# Patient Record
Sex: FEMALE | Race: WHITE | NOT HISPANIC OR LATINO | Employment: FULL TIME | URBAN - METROPOLITAN AREA
[De-identification: names, ages, dates, MRNs, and addresses within clinical notes are randomized per-mention and may not be internally consistent; named-entity substitution may affect disease eponyms.]

---

## 2023-03-07 ENCOUNTER — TELEPHONE (OUTPATIENT)
Dept: PRIMARY CARE | Facility: CLINIC | Age: 53
End: 2023-03-07
Payer: MEDICARE

## 2023-03-07 DIAGNOSIS — M54.12 CERVICAL RADICULOPATHY: Primary | ICD-10-CM

## 2023-03-07 NOTE — TELEPHONE ENCOUNTER
Pt called asking for a renew prescription for [employer] massage.  Mail it to pt and fax it to [facility] at  Fax [phone]    Please call pt back at [phone]

## 2023-03-21 DIAGNOSIS — H04.123 DRY EYE SYNDROME OF BOTH EYES: ICD-10-CM

## 2023-03-21 PROBLEM — H04.129 DRY EYE SYNDROME: Status: ACTIVE | Noted: 2023-03-21

## 2023-03-21 RX ORDER — LOTEPREDNOL ETABONATE AND TOBRAMYCIN 5; 3 MG/ML; MG/ML
1 SUSPENSION/ DROPS OPHTHALMIC 4 TIMES DAILY
COMMUNITY
Start: 2018-05-15 | End: 2023-03-21 | Stop reason: SDUPTHER

## 2023-03-21 RX ORDER — LOTEPREDNOL ETABONATE AND TOBRAMYCIN 5; 3 MG/ML; MG/ML
SUSPENSION/ DROPS OPHTHALMIC
Qty: 5 ML | Refills: 1 | Status: SHIPPED | OUTPATIENT
Start: 2023-03-21

## 2023-08-03 ENCOUNTER — TELEPHONE (OUTPATIENT)
Dept: PRIMARY CARE | Facility: CLINIC | Age: 53
End: 2023-08-03
Payer: MEDICARE

## 2023-08-03 DIAGNOSIS — J20.9 ACUTE BRONCHITIS, UNSPECIFIED ORGANISM: Primary | ICD-10-CM

## 2023-08-03 RX ORDER — DOXYCYCLINE 100 MG/1
100 CAPSULE ORAL 2 TIMES DAILY
Qty: 28 CAPSULE | Refills: 0 | Status: SHIPPED | OUTPATIENT
Start: 2023-08-03 | End: 2023-08-17

## 2023-08-03 NOTE — TELEPHONE ENCOUNTER
Patient calling / states she has had a sinus infection x 2 weeks. Nasal congestion / sinus headaches/pain. States she took a Covid test which was negative.     States dr has prescribed Doxycycline in the past for her sinus infections.   Would like to know if you be willing to prescribe medication without being seen   Uses [facility] on [address]     Last seen in Sept 2022     Please advise

## 2023-10-08 PROBLEM — L23.5 ALLERGIC CONTACT DERMATITIS DUE TO OTHER CHEMICAL PRODUCTS: Status: ACTIVE | Noted: 2018-09-11

## 2023-10-08 PROBLEM — M25.552 HIP PAIN, LEFT: Status: ACTIVE | Noted: 2023-10-08

## 2023-10-08 PROBLEM — H52.4 HYPEROPIA WITH PRESBYOPIA OF RIGHT EYE: Status: ACTIVE | Noted: 2023-10-08

## 2023-10-08 PROBLEM — H52.209 ASTIGMATISM: Status: ACTIVE | Noted: 2023-10-08

## 2023-10-08 PROBLEM — T14.8XXA HEMATOMA: Status: ACTIVE | Noted: 2023-10-08

## 2023-10-08 PROBLEM — J47.9 BRONCHIECTASIS (MULTI): Status: ACTIVE | Noted: 2023-10-08

## 2023-10-08 PROBLEM — Z12.11 COLON CANCER SCREENING: Status: ACTIVE | Noted: 2023-10-08

## 2023-10-08 PROBLEM — L01.00 IMPETIGO: Status: ACTIVE | Noted: 2018-09-11

## 2023-10-08 PROBLEM — H02.889 MEIBOMIAN GLAND DYSFUNCTION: Status: ACTIVE | Noted: 2023-10-08

## 2023-10-08 PROBLEM — R52 PAIN, GENERALIZED: Status: ACTIVE | Noted: 2023-10-08

## 2023-10-08 PROBLEM — C92.11: Status: ACTIVE | Noted: 2023-10-08

## 2023-10-08 PROBLEM — F41.1 ANXIETY IN ACUTE STRESS REACTION: Status: ACTIVE | Noted: 2023-10-08

## 2023-10-08 PROBLEM — H01.003 BLEPHARITIS OF BOTH EYES: Status: ACTIVE | Noted: 2023-10-08

## 2023-10-08 PROBLEM — D83.9 CVID (COMMON VARIABLE IMMUNODEFICIENCY) (MULTI): Status: ACTIVE | Noted: 2023-10-08

## 2023-10-08 PROBLEM — I49.8: Status: ACTIVE | Noted: 2023-10-08

## 2023-10-08 PROBLEM — S92.302A FRACTURE OF METATARSAL BONE OF LEFT FOOT: Status: ACTIVE | Noted: 2023-10-08

## 2023-10-08 PROBLEM — G89.29 CHRONIC PAIN OF LEFT KNEE: Status: ACTIVE | Noted: 2023-10-08

## 2023-10-08 PROBLEM — M81.0 OSTEOPOROSIS: Status: ACTIVE | Noted: 2023-10-08

## 2023-10-08 PROBLEM — F43.21 SITUATIONAL DEPRESSION: Status: ACTIVE | Noted: 2023-10-08

## 2023-10-08 PROBLEM — L30.9 ECZEMA: Status: ACTIVE | Noted: 2018-09-11

## 2023-10-08 PROBLEM — H52.00 HYPEROPIA: Status: ACTIVE | Noted: 2023-10-08

## 2023-10-08 PROBLEM — A60.00 HERPES GENITALIA: Status: ACTIVE | Noted: 2023-10-08

## 2023-10-08 PROBLEM — K13.0 ANGULAR CHEILITIS: Status: ACTIVE | Noted: 2023-10-08

## 2023-10-08 PROBLEM — I47.10 SUPRAVENTRICULAR TACHYCARDIA (CMS-HCC): Status: ACTIVE | Noted: 2023-10-08

## 2023-10-08 PROBLEM — H69.90 EUSTACHIAN TUBE DYSFUNCTION: Status: ACTIVE | Noted: 2023-10-08

## 2023-10-08 PROBLEM — S82.63XA CLOSED FRACTURE OF LATERAL MALLEOLUS: Status: ACTIVE | Noted: 2023-10-08

## 2023-10-08 PROBLEM — F40.243 FEAR OF FLYING: Status: ACTIVE | Noted: 2023-10-08

## 2023-10-08 PROBLEM — E03.9 HYPOTHYROIDISM: Status: ACTIVE | Noted: 2023-10-08

## 2023-10-08 PROBLEM — R10.32 LEFT LOWER QUADRANT PAIN: Status: ACTIVE | Noted: 2023-10-08

## 2023-10-08 PROBLEM — L20.9 ATOPIC DERMATITIS: Status: ACTIVE | Noted: 2023-10-08

## 2023-10-08 PROBLEM — M25.562 CHRONIC PAIN OF LEFT KNEE: Status: ACTIVE | Noted: 2023-10-08

## 2023-10-08 PROBLEM — M70.62 TROCHANTERIC BURSITIS OF LEFT HIP: Status: ACTIVE | Noted: 2023-10-08

## 2023-10-08 PROBLEM — H01.006 BLEPHARITIS OF BOTH EYES: Status: ACTIVE | Noted: 2023-10-08

## 2023-10-08 PROBLEM — M20.11 HALLUX VALGUS, RIGHT: Status: ACTIVE | Noted: 2023-10-08

## 2023-10-08 PROBLEM — M25.551 BILATERAL HIP PAIN: Status: ACTIVE | Noted: 2023-10-08

## 2023-10-08 PROBLEM — R10.2 FEMALE PELVIC PAIN: Status: ACTIVE | Noted: 2023-10-08

## 2023-10-08 PROBLEM — R21 RASH AND OTHER NONSPECIFIC SKIN ERUPTION: Status: ACTIVE | Noted: 2018-09-11

## 2023-10-08 PROBLEM — B37.0 CANDIDIASIS OF MOUTH: Status: ACTIVE | Noted: 2023-10-08

## 2023-10-08 PROBLEM — F43.0 ANXIETY IN ACUTE STRESS REACTION: Status: ACTIVE | Noted: 2023-10-08

## 2023-10-08 PROBLEM — H52.01 HYPEROPIA WITH PRESBYOPIA OF RIGHT EYE: Status: ACTIVE | Noted: 2023-10-08

## 2023-10-08 PROBLEM — L70.9 ACNE: Status: ACTIVE | Noted: 2018-09-11

## 2023-10-08 PROBLEM — H52.03 HYPEROPIA OF BOTH EYES: Status: ACTIVE | Noted: 2023-10-08

## 2023-10-08 PROBLEM — I49.9 ARRHYTHMIA: Status: ACTIVE | Noted: 2023-10-08

## 2023-10-08 PROBLEM — N95.2 VAGINAL ATROPHY: Status: ACTIVE | Noted: 2023-10-08

## 2023-10-08 PROBLEM — M25.552 BILATERAL HIP PAIN: Status: ACTIVE | Noted: 2023-10-08

## 2023-10-08 PROBLEM — M54.12 CERVICAL RADICULOPATHY: Status: ACTIVE | Noted: 2023-10-08

## 2023-10-08 PROBLEM — S92.353A CLOSED FRACTURE OF FIFTH METATARSAL BONE: Status: ACTIVE | Noted: 2023-10-08

## 2023-10-08 PROBLEM — E89.40 OVARIAN FAILURE DUE TO CANCER THERAPY: Status: ACTIVE | Noted: 2023-10-08

## 2023-10-08 RX ORDER — FLUOCINONIDE 0.5 MG/G
1 CREAM TOPICAL 2 TIMES DAILY
COMMUNITY
Start: 2017-05-09 | End: 2023-10-12 | Stop reason: ALTCHOICE

## 2023-10-08 RX ORDER — MUPIROCIN 20 MG/G
1 OINTMENT TOPICAL
COMMUNITY
Start: 2017-10-31 | End: 2023-10-12 | Stop reason: ALTCHOICE

## 2023-10-08 RX ORDER — CARBOXYMETHYLCELLULOSE SODIUM 0.5 %
1 DROPPERETTE, SINGLE-USE DROP DISPENSER OPHTHALMIC (EYE) 2 TIMES DAILY PRN
COMMUNITY
Start: 2021-01-06

## 2023-10-08 RX ORDER — DESOXIMETASONE 0.5 MG/G
1 OINTMENT TOPICAL DAILY
COMMUNITY
Start: 2018-03-20 | End: 2023-10-12 | Stop reason: ALTCHOICE

## 2023-10-08 RX ORDER — CLOBETASOL PROPIONATE 0.5 MG/G
1 CREAM TOPICAL 2 TIMES DAILY
COMMUNITY
Start: 2014-10-29 | End: 2023-10-12 | Stop reason: ALTCHOICE

## 2023-10-08 RX ORDER — SPIRONOLACTONE 50 MG/1
50 TABLET, FILM COATED ORAL DAILY
COMMUNITY
Start: 2014-10-29 | End: 2023-10-12 | Stop reason: ALTCHOICE

## 2023-10-08 RX ORDER — METOPROLOL SUCCINATE 25 MG/1
25 TABLET, EXTENDED RELEASE ORAL DAILY
COMMUNITY
Start: 2013-01-10

## 2023-10-08 RX ORDER — KETOCONAZOLE 20 MG/G
1 CREAM TOPICAL 2 TIMES DAILY
COMMUNITY
Start: 2017-10-24 | End: 2023-10-12 | Stop reason: ALTCHOICE

## 2023-10-08 RX ORDER — CYCLOBENZAPRINE HCL 10 MG
10 TABLET ORAL 3 TIMES DAILY PRN
COMMUNITY
End: 2023-10-12 | Stop reason: ALTCHOICE

## 2023-10-08 RX ORDER — MULTIVITAMIN
1 TABLET ORAL DAILY
COMMUNITY

## 2023-10-08 RX ORDER — HYDROCORTISONE 25 MG/G
1 OINTMENT TOPICAL 2 TIMES DAILY
COMMUNITY
Start: 2017-10-24

## 2023-10-08 RX ORDER — FLUTICASONE PROPIONATE 50 MCG
1 SPRAY, SUSPENSION (ML) NASAL DAILY
COMMUNITY
Start: 2022-11-08 | End: 2023-10-12 | Stop reason: ALTCHOICE

## 2023-10-11 RX ORDER — SALINE NASAL SPRAY 1.5 OZ
SOLUTION NASAL
COMMUNITY
Start: 2022-11-08

## 2023-10-11 RX ORDER — VALACYCLOVIR HYDROCHLORIDE 500 MG/1
1 TABLET, FILM COATED ORAL DAILY
COMMUNITY
Start: 2018-04-23

## 2023-10-11 NOTE — PROGRESS NOTES
Subjective   Reason for Visit: Patsy Levine is an 52 y.o. female here for her Subsequent Medicare Assessment        She is not active due to the foot fracture.   She does not like that she is gaining weight   She is not doing as much biking as she used to do     She is in her home.   She did some of her own electrical work in the house and converted some of her appliances  She has a smaller plot of land and spent the summer working in the yard      She treated herself for a sinus infection but other than that she has been well     She has no issues with bowel movements    The palpitations have resolved     HEALTH  [facility] and 5/18 +ASCUS but HPV-, 5/19 negative, 2023- w/ Gyn  Mammo 1/13, 1/14, 11/2021 , 1/7/2022, 2023 and will do every other year now -[facility]  US of breasts 11/2021 normal   BD 1/13 T-2.7 and 1/15 T-2.5 hip, 2019, 8/20/20 , 9/20/2022 -2.8 hand no change, she does this at [facility]  Colon 11/11/2021 with [facility] Dr Lewis was normal   EKG  2014, 10/2021, 11/2021 8/2022   Urine 12/18  Hep C 8/16 -  Hep A 3/19  Flu declines   TDAP 2005 and recommend updating 2022  Typhoid 3/19  Pneumo 2010, 10/4/2022   Shingrix recommended and will check coverage   Pfizer CVD vaccine 3/22/2021 and 4/2/2021 booster 10/26/2021   Ophth She saw Dr Topete in 2021, no glaucoma or MD. She is using Zylet for BELLA OU.       Patient Care Team:  Veronique Cesar MD as PCP - General  Veronique Cesar MD as PCP - [facility] Attributed Provider     Review of Systems  All systems negative except those listed in the HPI      Past Medical, Surgical, and Family History reviewed and updated in chart.  Reviewed all medications by prescribing practitioner or clinical pharmacist   (such as prescriptions, OTCs, herbal therapies and supplements) and documented in the medical record      Objective   Vitals:  Visit Vitals  /62   Pulse 82   Temp 36.1 °C (97 °F)     Body mass index is 19.87 kg/m².     Physical Exam    Assessment/Plan   Problem List Items Addressed This Visit    None  Visit Diagnoses       CML (chronic myelocytic leukemia) (CMS/[facility])    -  Primary          Subsequent Medicare Assessment completed   Labs ordered     Medicare Wellness completed  -  Discussed healthy diet and regular exercise.    -  Physical exam overall unremarkable. Immunizations reviewed and updated accordingly. Healthy lifestyle choices discussed (tobacco avoidance, appropriate alcohol use, avoidance of illicit substances).   -  Patient is wearing seatbelt.   -  Screening lab work ordered as indicated.    -  Age appropriate screening tests reviewed with patient.        We spent 15 minutes discussing depression screen and there is nothing found that is of concern for underling depression. The PQH form was filled and the meds reviewed. She is seeing a psychiatrist     We spent 15 minutes discussing alcohol use and there are no concerns about overuse. The 15 min was spent in going over any issues of use of alcohol. Once a month     She has grab bars in the shower.  She has not fallen recently and no risk of falls in the house   She has good lighting around the house and functioning smoke detectors.     Seen in the ED on 8/8/2023 for left ankle pain  Nondisplaced left distal fibular fracture and fifth metatarsal fracture   She had left ankle pain since 8/8/2023 after falling off a rock running from bees   Xray showed fifth metatarsal fracture 8/2023  The patient received a splint and crutches.  The patient was discharged with instructions to follow-up with Ortho.   She saw Dr LoPresti in 8/2023 and was placed in a boot   She saw Dr LoPresti again in 8/2023 and healing well     She has no issues with her hearing to report     Her weight/ BMI is in low normal range in office. Recommend she maintain  Her weight is 125 with BMI at 19.87 IO 10/2023  Her increased stressors contributed to her weight  loss     Right sided chest pain that began acutely and woke her from sleep  Seen in the ED on 8/23/2022    EKG 8/2022 was sinus with occas PVC's and rate of 76  CT A/P 8/2022 normal   She saw cardiology at  10/3/2022  She had an ECHO and Holter done and was told imaging improved.   PVCs went from 20% to 3%     SVT and is tachy from the POTs:   Continue Toprol XL 25 mg daily   EKG 8/2022 was sinus with occas PVC's and rate of 76  [facility] and [facility] showed a few PVC's 11/2021 otherwise normal   She saw cardiology at  yesterday 10/3/2022  She had an ECHO and Holter done and was told imaging improved. PVCs went from 20% to 3%     I have spent 15 min face to face with this patient discussing their cardiac risk and behavioral therapies of nutrition choices and exercise. We are trying to eliminate habits that are contributing to their cardiac risk.  We agreed on a plan of how they can reduce their current CV risk   ACO score 3/6 IO 10/2023    Bronchiectasis:  Only affects her with humidity and exertion combined.  The inhalers give her infections so limited in treatment    Asthma:   Controlled. No atopy - no allergy detected.  The inhalers give her infections so limited in treatment    CVID -    Continue Hyqvia 15 grams Q month at home.   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 Continue replacement immunoglobulin.     LE edema:  Worsens in the heat and has Lasix for prn use     Situational depression:   She bought a house and has moved in now   [facility] and helping her since the divorce in 2014   [facility] therapist put her on Wellbutrin but she could not tolerate it so stopped   She will continue following with the [facility]     Saw Dr Choe in  12/1/16 for recurrent UTI :  She is much better with taking D mannose with cranberry daily     Colitis versus diverticulitis versus inflammatory bowel:   Her mother and sister have been diagnosed with diverticulitis   Colonoscopy 11/11/2021 with [facility] Dr Lewis was normal   CT abd 9/2021 was no evidence of colitis or diverticulitis, moderate stool   Recommend she begin a Probiotic daily while taking Abx   Recommend she avoid eating popcorn    Seen for possible SLE issues by Dr Martinez in 9/2013:  Nothing found and no arthralgias     Cervicalgia:    Continue Flexeril 10 mg prn   Recommend she continue PT exercises at home   Recommend she be mindful when moving and lifting.     She saw Dr Ernandez on 6/10/19 for right foot pain   Xray showed mild degenerative changes of the 1st MTP with hallux valgus deformity with HVA of 23*.   Recommended custom orthotics     Rashes on her legs: she starts developing rashes in winter  She sees Dr Horta and except for the Cobalt allergy nothing else found    Alopecia:  She saw derm 10/26/2021     Bacterial vaginosis issues and Dx with Herpes 2   Estrace cream only thing can use   Continue Valtrex prn     Pap [facility] in 20203 [facility]   [facility]amm[facility] in [facility]. She only has to do a mammogram every other year per [facility]  Breast exam normal 10/2023    [facility] and was T-2.8. She stopped Fosamax after 15 years.   Recommend taking TC Calcium 500 mg BID, OTC Vitamin D 2000 UT dailu and eat 2 servings of calcium enriched foods daily. Discussed weight bearing exercises. She remains busy and works out routinely   Colonoscopy normal 11/11/2021 with [facility] Dr Lewis     Ophth:  She saw Dr Topete in 2021 and no glaucoma or MD. She is using Zylet for BELLA OU.     I spent 15 min with the patient discussing their wishes for end of life choices.   We discussed the need for a Living Will and that wishes should be discussed with Family. The DNR status was reviewed, and we discussed  the options of this and, the DNR _CC options as well.   We also went over how important it was to have these choices written down and clear for any surviving family so that their wishes are followed   The patient and I came to to following agreement : She does not have a LW or MPOA. I provided her with the forms to complete and have notarized 10/2022. Recommend she bring a copy of her completed forms in office to have scanned in her chart 10/2023     Hep C 8/16 -  Hep A 3/19  Flu declines   TDAP 2005 and recommend updating 2022  Typhoid 3/19  Pneumo 2010, 10/4/2022   Shingrix recommended and will check coverage   Pfizer CVD vaccine 3/22/2021 and 4/2/2021 booster 10/26/2021       Some elements in the chart were copied from Dr. Cesar's last office visit with patient.   Notes have been updated where appropriate, and reflect my current medical decision making from today.     RTC in 1 year for CPE or sooner if needed    Scribe Attestation  By signing my name below, I, Orin Ding , Scribe   attest that this documentation has been prepared under the direction and in the presence of Veronique Cesar MD.

## 2023-10-12 ENCOUNTER — LAB (OUTPATIENT)
Dept: LAB | Facility: LAB | Age: 53
End: 2023-10-12
Payer: MEDICARE

## 2023-10-12 ENCOUNTER — OFFICE VISIT (OUTPATIENT)
Dept: PRIMARY CARE | Facility: CLINIC | Age: 53
End: 2023-10-12
Payer: MEDICARE

## 2023-10-12 VITALS
HEIGHT: 67 IN | OXYGEN SATURATION: 96 % | WEIGHT: 125 LBS | TEMPERATURE: 97 F | HEART RATE: 82 BPM | DIASTOLIC BLOOD PRESSURE: 62 MMHG | SYSTOLIC BLOOD PRESSURE: 118 MMHG | BODY MASS INDEX: 19.62 KG/M2

## 2023-10-12 DIAGNOSIS — R93.3 ABNORMAL FINDINGS ON DIAGNOSTIC IMAGING OF OTHER PARTS OF DIGESTIVE TRACT: ICD-10-CM

## 2023-10-12 DIAGNOSIS — Z00.00 HEALTHCARE MAINTENANCE: Primary | ICD-10-CM

## 2023-10-12 DIAGNOSIS — D83.9 CVID (COMMON VARIABLE IMMUNODEFICIENCY) (MULTI): ICD-10-CM

## 2023-10-12 DIAGNOSIS — E03.9 HYPOTHYROIDISM, UNSPECIFIED TYPE: ICD-10-CM

## 2023-10-12 DIAGNOSIS — L29.8 OTHER PRURITUS: ICD-10-CM

## 2023-10-12 DIAGNOSIS — M81.0 AGE-RELATED OSTEOPOROSIS WITHOUT CURRENT PATHOLOGICAL FRACTURE: ICD-10-CM

## 2023-10-12 DIAGNOSIS — C92.11 LEUKEMIA, CHRONIC MYELOID, IN REMISSION (MULTI): ICD-10-CM

## 2023-10-12 DIAGNOSIS — R53.82 CHRONIC FATIGUE: ICD-10-CM

## 2023-10-12 DIAGNOSIS — H01.006 BLEPHARITIS OF BOTH EYES, UNSPECIFIED EYELID, UNSPECIFIED TYPE: ICD-10-CM

## 2023-10-12 DIAGNOSIS — F41.1 ANXIETY IN ACUTE STRESS REACTION: ICD-10-CM

## 2023-10-12 DIAGNOSIS — H01.003 BLEPHARITIS OF BOTH EYES, UNSPECIFIED EYELID, UNSPECIFIED TYPE: ICD-10-CM

## 2023-10-12 DIAGNOSIS — J47.9 BRONCHIECTASIS WITHOUT COMPLICATION (MULTI): ICD-10-CM

## 2023-10-12 DIAGNOSIS — M54.12 CERVICAL RADICULOPATHY: ICD-10-CM

## 2023-10-12 DIAGNOSIS — I49.8: ICD-10-CM

## 2023-10-12 DIAGNOSIS — F43.21 SITUATIONAL DEPRESSION: ICD-10-CM

## 2023-10-12 DIAGNOSIS — F43.0 ANXIETY IN ACUTE STRESS REACTION: ICD-10-CM

## 2023-10-12 DIAGNOSIS — C92.10 CML (CHRONIC MYELOCYTIC LEUKEMIA) (MULTI): ICD-10-CM

## 2023-10-12 PROBLEM — L01.00 IMPETIGO: Status: RESOLVED | Noted: 2018-09-11 | Resolved: 2023-10-12

## 2023-10-12 PROBLEM — B37.0 CANDIDIASIS OF MOUTH: Status: RESOLVED | Noted: 2023-10-08 | Resolved: 2023-10-12

## 2023-10-12 LAB
25(OH)D3 SERPL-MCNC: 38 NG/ML (ref 30–100)
ALBUMIN SERPL BCP-MCNC: 4.2 G/DL (ref 3.4–5)
ALP SERPL-CCNC: 56 U/L (ref 33–110)
ALT SERPL W P-5'-P-CCNC: 18 U/L (ref 7–45)
ANION GAP SERPL CALC-SCNC: 14 MMOL/L (ref 10–20)
AST SERPL W P-5'-P-CCNC: 22 U/L (ref 9–39)
BASOPHILS # BLD AUTO: 0.03 X10*3/UL (ref 0–0.1)
BASOPHILS NFR BLD AUTO: 0.5 %
BILIRUB SERPL-MCNC: 0.6 MG/DL (ref 0–1.2)
BUN SERPL-MCNC: 17 MG/DL (ref 6–23)
CALCIUM SERPL-MCNC: 9.4 MG/DL (ref 8.6–10.3)
CHLORIDE SERPL-SCNC: 105 MMOL/L (ref 98–107)
CHOLEST SERPL-MCNC: 199 MG/DL (ref 0–199)
CHOLESTEROL/HDL RATIO: 2.3
CO2 SERPL-SCNC: 26 MMOL/L (ref 21–32)
CREAT SERPL-MCNC: 0.73 MG/DL (ref 0.5–1.05)
EOSINOPHIL # BLD AUTO: 0.47 X10*3/UL (ref 0–0.7)
EOSINOPHIL NFR BLD AUTO: 8.5 %
ERYTHROCYTE [DISTWIDTH] IN BLOOD BY AUTOMATED COUNT: 12.3 % (ref 11.5–14.5)
GFR SERPL CREATININE-BSD FRML MDRD: >90 ML/MIN/1.73M*2
GLUCOSE SERPL-MCNC: 84 MG/DL (ref 74–99)
HCT VFR BLD AUTO: 37.5 % (ref 36–46)
HDLC SERPL-MCNC: 88.1 MG/DL
HGB BLD-MCNC: 12.7 G/DL (ref 12–16)
IMM GRANULOCYTES # BLD AUTO: 0.01 X10*3/UL (ref 0–0.7)
IMM GRANULOCYTES NFR BLD AUTO: 0.2 % (ref 0–0.9)
IRON SATN MFR SERPL: 24 % (ref 25–45)
IRON SERPL-MCNC: 96 UG/DL (ref 35–150)
LDH SERPL L TO P-CCNC: 137 U/L (ref 84–246)
LDLC SERPL CALC-MCNC: 101 MG/DL
LYMPHOCYTES # BLD AUTO: 1.74 X10*3/UL (ref 1.2–4.8)
LYMPHOCYTES NFR BLD AUTO: 31.6 %
MCH RBC QN AUTO: 31.3 PG (ref 26–34)
MCHC RBC AUTO-ENTMCNC: 33.9 G/DL (ref 32–36)
MCV RBC AUTO: 92 FL (ref 80–100)
MONOCYTES # BLD AUTO: 0.35 X10*3/UL (ref 0.1–1)
MONOCYTES NFR BLD AUTO: 6.4 %
NEUTROPHILS # BLD AUTO: 2.91 X10*3/UL (ref 1.2–7.7)
NEUTROPHILS NFR BLD AUTO: 52.8 %
NON HDL CHOLESTEROL: 111 MG/DL (ref 0–149)
NRBC BLD-RTO: 0 /100 WBCS (ref 0–0)
PLATELET # BLD AUTO: 199 X10*3/UL (ref 150–450)
PMV BLD AUTO: 9.5 FL (ref 7.5–11.5)
POTASSIUM SERPL-SCNC: 4.1 MMOL/L (ref 3.5–5.3)
PROT SERPL-MCNC: 6.4 G/DL (ref 6.4–8.2)
RBC # BLD AUTO: 4.06 X10*6/UL (ref 4–5.2)
SODIUM SERPL-SCNC: 141 MMOL/L (ref 136–145)
TIBC SERPL-MCNC: 394 UG/DL (ref 240–445)
TRIGL SERPL-MCNC: 52 MG/DL (ref 0–149)
TSH SERPL-ACNC: 3.01 MIU/L (ref 0.44–3.98)
UIBC SERPL-MCNC: 298 UG/DL (ref 110–370)
VIT B12 SERPL-MCNC: 429 PG/ML (ref 211–911)
VLDL: 10 MG/DL (ref 0–40)
WBC # BLD AUTO: 5.5 X10*3/UL (ref 4.4–11.3)

## 2023-10-12 PROCEDURE — 36415 COLL VENOUS BLD VENIPUNCTURE: CPT

## 2023-10-12 PROCEDURE — 83615 LACTATE (LD) (LDH) ENZYME: CPT

## 2023-10-12 PROCEDURE — 80053 COMPREHEN METABOLIC PANEL: CPT

## 2023-10-12 PROCEDURE — 1036F TOBACCO NON-USER: CPT | Performed by: INTERNAL MEDICINE

## 2023-10-12 PROCEDURE — 99214 OFFICE O/P EST MOD 30 MIN: CPT | Performed by: INTERNAL MEDICINE

## 2023-10-12 PROCEDURE — G0439 PPPS, SUBSEQ VISIT: HCPCS | Performed by: INTERNAL MEDICINE

## 2023-10-12 PROCEDURE — 83550 IRON BINDING TEST: CPT

## 2023-10-12 PROCEDURE — 82607 VITAMIN B-12: CPT

## 2023-10-12 PROCEDURE — 82306 VITAMIN D 25 HYDROXY: CPT

## 2023-10-12 PROCEDURE — 84443 ASSAY THYROID STIM HORMONE: CPT

## 2023-10-12 PROCEDURE — 83540 ASSAY OF IRON: CPT

## 2023-10-12 PROCEDURE — 85025 COMPLETE CBC W/AUTO DIFF WBC: CPT

## 2023-10-12 PROCEDURE — 80061 LIPID PANEL: CPT

## 2023-10-12 ASSESSMENT — ACTIVITIES OF DAILY LIVING (ADL)
BATHING: INDEPENDENT
BATHING: INDEPENDENT
DRESSING: INDEPENDENT
DOING_HOUSEWORK: INDEPENDENT
DRESSING: INDEPENDENT
TAKING_MEDICATION: INDEPENDENT
GROCERY_SHOPPING: INDEPENDENT
MANAGING_FINANCES: INDEPENDENT

## 2023-10-12 ASSESSMENT — PATIENT HEALTH QUESTIONNAIRE - PHQ9
1. LITTLE INTEREST OR PLEASURE IN DOING THINGS: NOT AT ALL
1. LITTLE INTEREST OR PLEASURE IN DOING THINGS: NOT AT ALL
2. FEELING DOWN, DEPRESSED OR HOPELESS: NOT AT ALL
2. FEELING DOWN, DEPRESSED OR HOPELESS: NOT AT ALL
SUM OF ALL RESPONSES TO PHQ9 QUESTIONS 1 AND 2: 0
SUM OF ALL RESPONSES TO PHQ9 QUESTIONS 1 AND 2: 0
2. FEELING DOWN, DEPRESSED OR HOPELESS: NOT AT ALL
SUM OF ALL RESPONSES TO PHQ9 QUESTIONS 1 AND 2: 0
1. LITTLE INTEREST OR PLEASURE IN DOING THINGS: NOT AT ALL

## 2023-10-12 ASSESSMENT — ENCOUNTER SYMPTOMS
OCCASIONAL FEELINGS OF UNSTEADINESS: 0
LOSS OF SENSATION IN FEET: 0
DEPRESSION: 0

## 2023-12-18 ENCOUNTER — TELEPHONE (OUTPATIENT)
Dept: PRIMARY CARE | Facility: CLINIC | Age: 53
End: 2023-12-18
Payer: MEDICARE

## 2023-12-18 DIAGNOSIS — N39.0 ACUTE UTI: Primary | ICD-10-CM

## 2023-12-18 RX ORDER — NITROFURANTOIN 25; 75 MG/1; MG/1
100 CAPSULE ORAL 2 TIMES DAILY
Qty: 14 CAPSULE | Refills: 0 | Status: SHIPPED | OUTPATIENT
Start: 2023-12-18 | End: 2023-12-25

## 2024-07-03 ENCOUNTER — TELEMEDICINE (OUTPATIENT)
Dept: PRIMARY CARE | Facility: CLINIC | Age: 54
End: 2024-07-03
Payer: MEDICARE

## 2024-07-03 ENCOUNTER — TELEPHONE (OUTPATIENT)
Dept: PRIMARY CARE | Facility: CLINIC | Age: 54
End: 2024-07-03

## 2024-07-03 DIAGNOSIS — J98.8 RESPIRATORY TRACT INFECTION DUE TO COVID-19 VIRUS: ICD-10-CM

## 2024-07-03 DIAGNOSIS — J98.8 RESPIRATORY TRACT INFECTION DUE TO COVID-19 VIRUS: Primary | ICD-10-CM

## 2024-07-03 DIAGNOSIS — U07.1 RESPIRATORY TRACT INFECTION DUE TO COVID-19 VIRUS: Primary | ICD-10-CM

## 2024-07-03 DIAGNOSIS — U07.1 RESPIRATORY TRACT INFECTION DUE TO COVID-19 VIRUS: ICD-10-CM

## 2024-07-03 PROCEDURE — 99213 OFFICE O/P EST LOW 20 MIN: CPT | Performed by: INTERNAL MEDICINE

## 2024-07-03 PROCEDURE — 1036F TOBACCO NON-USER: CPT | Performed by: INTERNAL MEDICINE

## 2024-07-03 ASSESSMENT — PATIENT HEALTH QUESTIONNAIRE - PHQ9
SUM OF ALL RESPONSES TO PHQ9 QUESTIONS 1 AND 2: 0
1. LITTLE INTEREST OR PLEASURE IN DOING THINGS: NOT AT ALL
2. FEELING DOWN, DEPRESSED OR HOPELESS: NOT AT ALL

## 2024-07-03 NOTE — TELEPHONE ENCOUNTER
Pts pharmacy called in stating they do not fill Paxlovid still. They did leave a message for the pt to inform her and stated to possibly try a [facility] or [facility]. Please advise. Thank you!

## 2024-07-03 NOTE — PROGRESS NOTES
Subjective   Patient ID: Patsy Levine is a 53 y.o. female who presents for URI (Patient has COVID /Virtual or Telephone Consent//An interactive audio and video telecommunication system which permits real time communications between the patient (at the originating site) and provider (at the distant site) was utilized to provide this telehealth service. /Verbal consent was requested and obtained from Patsy Levine on this date, 07/03/24 for a telehealth visit.  ).    Virtual or Telephone Consent    An interactive audio and video telecommunication system which permits real time communications between the patient (at the originating site) and provider (at the distant site) was utilized to provide this telehealth service.   Verbal consent was requested and obtained from Patsy Levine on this date, 07/03/24 for a telehealth visit.     Patient with a history of bronchiectasis-developing worsening cough and congestion over the last several days.  Tested positive yesterday, today is the third day of her symptoms.  Mainly congestion no shortness of breath no intestinal symptoms no real fevers does not feel nearly as bad as when she had COVID in November 2020    URI          Review of Systems    Objective   There were no vitals taken for this visit.    Physical Exam  Vitals reviewed.   Constitutional:       Appearance: Normal appearance.      Comments: Well-appearing adult in no distress, alert, appearance at baseline  Virtual exam showed patient to be alert active and otherwise appropriate  Spoke normally, with speech at baseline, without evidence of respiratory distress  Facial exam, unremarkable with no obvious eye findings  Skin exam without any obvious rash or notable findings  Mental status exam-appropriate without any worrisome findings, with normal mood and thought content     Neurological:      Mental Status: She is alert.         Assessment/Plan   Problem List Items Addressed This Visit    None  Visit Diagnoses          Codes    Respiratory tract infection due to COVID-19 virus    -  Primary U07.1, J98.8    Relevant Medications    nirmatrelvir-ritonavir (PAXLOVID) 300 mg (150 mg x 2)-100 mg tablet therapy pack             COVID respiratory illness-reviewed measures to help with supportive care.  Specifically encouraged ample fluids to restore hydration and to prevent further dehydration.  Tylenol extra strength, 2 pills 3 times daily for 3 to 4 days, then as needed encouraged.  Flonase nasal spray, 2 sprays twice daily for 2 to 3 weeks till postnasal drip congestion and cough clear.  Antiviral ordered per protocol.  Finally, encouraged patient to call with any concerns or questions      RE Quarantining;     Prior Guidance: The previous COVID-19 guidance recommended a minimum isolation period of 5 days plus a period of post-isolation precautions and was created during the public health emergency with lower population immunity, fewer tools to combat respiratory viruses, and higher rates of severe illness, including hospitalizations and deaths.    Updated Guidance: Since March 1, 2024, the updated Respiratory Virus Guidance recommends that people stay home and away from others until at least 24 hours after both their symptoms are getting better overall, and they have not had a fever (and are not using fever-reducing medication). Note that depending on the length of symptoms, this period could be shorter, the same, or longer than the previous guidance for COVID-19.    It is important to note that the guidance doesn’t end with staying home and away from others when sick. The guidance encourages added precaution over the next five days after time at home, away from others, is over. Since some people remain contagious beyond the “stay-at-home” period, a period of added precaution using prevention strategies, such as taking more steps for  air, enhancing hygiene practices, wearing a well-fitting mask, keeping a distance from  others, and/or getting tested for respiratory viruses can lower the chance of spreading respiratory viruses to others.      History of bronchiectasis-she is scheduled routinely [facility] she will follow her symptoms understanding that if she has a chronic cough component she will discuss a prednisone taper

## 2024-08-28 NOTE — PROGRESS NOTES
PCP  Veronique Cesar MD         CHIEF COMPLAINT:  urinary retention, urethra scar tissue         HISTORY OF PRESENT ILLNESS:  This is a  53 y.o. female who presents with urinary retention, urethra scar tissue.  Patient was seen at the [facility].  She did undergo cystoscopy for microscopic hematuria evaluation on June 24, 2024.  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  She had to dilate from 8 Latvian to 20 Latvian over a wire following that they were able to put a cystoscope into her bladder examination of the bladder was negative for pathology.  Then the replaced Watters catheter 16 Latvian for a week.  Clinically patient was able to void at the trial of void.  She has noticed an improved stream.  Prior to that and prior to the procedure she was spraying while she was voiding.  My review of her previous notes demonstrates that she did have a history of recurrent urinary tract infection and at one point when I saw her in the past we did recommend her to use estrogen cream.  Patient notes that she has not been using estrogen cream but she does use d-mannose for recurrent UTIs and coconut oil for dryness symptoms.  With the use of the d-mannose she has had no problem with urinary tract infections and that has been controlled.      The following were reviewed to gain additional history: This is an extract from my previous notes in December 2018.  47 year old female with history of recurrent UTI presents today for an evaluation of UTI symptoms. Patient notes she feels that she is constantly preventing or treating UTIs and BV infections. Patient is currently taking postcoital Clindamycin (300 mg) for BV prevention by her oncologist. She is also taking postcoital Macrodantin 50 mg suppression, probiotics, and occasionally uses Estrace cream for UTIs. Notes her symptoms consistently and only arise after intercourse.  UA today is negative, (+) blood.  Discussed use of D-Mannose 2g qd for UTI prevention, explained. Patient should continue use of postcoital Macrodantin and should keep up with use of Estrace cream 2x/week for UTI prevention. Will try a few doses of Clindamycin for BV, explained. Will refer the patient to gynecology for evaluation of bacterial vaginosis and UTIs. Patient will followup in 3 months or sooner if needed. All questions and concerns were addressed. Patient verbalizes understanding and has no other questions at this time.                    PHYSICAL EXAMINATION:  No LMP recorded.  There is no height or weight on file to calculate BMI.  ,There were no vitals filed for this visit.    General Appearance: well appearing  Neuro: Alert and oriented     Pelvic:  Genitourinary:  normal external genitalia, Bartholin's glands negative, Coggon's glands negative  Urethra   normal meatus, non-tender, no periurethral mass  Vaginal mucosa  no ulcerations or lesions  Atrophy positive  CST negative        PVR (by Ultrasound): 0    Urine dip: No results found for this or any previous visit (from the past 24 hour(s)).  Results for orders placed or performed in visit on 08/29/24 (from the past 96 hour(s))   POCT UA Automated manually resulted   Result Value Ref Range    POC Color, Urine Yellow Straw, Yellow, Light-Yellow    POC Appearance, Urine Clear Clear    POC Glucose, Urine NEGATIVE NEGATIVE mg/dl    POC Bilirubin, Urine NEGATIVE NEGATIVE    POC Ketones, Urine NEGATIVE NEGATIVE mg/dl    POC Specific Gravity, Urine 1.015 1.005 - 1.035    POC Blood, Urine SMALL (1+) (A) NEGATIVE    POC PH, Urine 6.0 No Reference Range Established PH    POC Protein, Urine NEGATIVE NEGATIVE, 30 (1+) mg/dl    POC Urobilinogen, Urine 0.2 0.2, 1.0 EU/DL    Poc Nitrite, Urine NEGATIVE NEGATIVE    POC Leukocytes, Urine TRACE (A) NEGATIVE        IMPRESSION AND PLAN:  Patsy Levine is a 53 y.o. with history of meatal stenosis.  Patient status [facility] this [facility].  Patient has been voiding with a good flow since then.  She had remote history of recurrent urinary tract infections that has been managed with d-mannose and coconut oil.  Patient was examined today there was no evidence of recurrent meatal stricture on my exam.  There is no evidence of pelvic organ prolapse there is vaginal mucosal atrophy.  We did talk to her about the use of vaginal estrogen cream.  We will have her return in the office in 2 months to perform a uroflow.  Her residual is normal today and that is reassuring.

## 2024-08-29 ENCOUNTER — APPOINTMENT (OUTPATIENT)
Dept: UROLOGY | Facility: CLINIC | Age: 54
End: 2024-08-29
Payer: MEDICARE

## 2024-08-29 VITALS
DIASTOLIC BLOOD PRESSURE: 60 MMHG | HEIGHT: 66 IN | BODY MASS INDEX: 20.09 KG/M2 | WEIGHT: 125 LBS | HEART RATE: 60 BPM | SYSTOLIC BLOOD PRESSURE: 90 MMHG

## 2024-08-29 DIAGNOSIS — R33.9 URINARY RETENTION: ICD-10-CM

## 2024-08-29 DIAGNOSIS — N95.2 ATROPHIC VAGINITIS: ICD-10-CM

## 2024-08-29 DIAGNOSIS — N39.498: ICD-10-CM

## 2024-08-29 LAB
POC APPEARANCE, URINE: CLEAR
POC BILIRUBIN, URINE: NEGATIVE
POC BLOOD, URINE: ABNORMAL
POC COLOR, URINE: YELLOW
POC GLUCOSE, URINE: NEGATIVE MG/DL
POC KETONES, URINE: NEGATIVE MG/DL
POC LEUKOCYTES, URINE: ABNORMAL
POC NITRITE,URINE: NEGATIVE
POC PH, URINE: 6 PH
POC PROTEIN, URINE: NEGATIVE MG/DL
POC SPECIFIC GRAVITY, URINE: 1.01
POC UROBILINOGEN, URINE: 0.2 EU/DL

## 2024-08-29 PROCEDURE — 3008F BODY MASS INDEX DOCD: CPT | Performed by: UROLOGY

## 2024-08-29 PROCEDURE — 81003 URINALYSIS AUTO W/O SCOPE: CPT | Performed by: UROLOGY

## 2024-08-29 PROCEDURE — 99204 OFFICE O/P NEW MOD 45 MIN: CPT | Performed by: UROLOGY

## 2024-08-29 RX ORDER — ESTRADIOL 0.1 MG/G
CREAM VAGINAL
Qty: 42.5 G | Refills: 5 | Status: SHIPPED | OUTPATIENT
Start: 2024-08-29 | End: 2025-08-29

## 2024-10-13 DIAGNOSIS — C92.11 LEUKEMIA, CHRONIC MYELOID, IN REMISSION (MULTI): ICD-10-CM

## 2024-10-13 DIAGNOSIS — R21 RASH AND OTHER NONSPECIFIC SKIN ERUPTION: ICD-10-CM

## 2024-10-13 DIAGNOSIS — C92.10 CML (CHRONIC MYELOCYTIC LEUKEMIA) (MULTI): ICD-10-CM

## 2024-10-13 DIAGNOSIS — R73.09 OTHER ABNORMAL GLUCOSE: ICD-10-CM

## 2024-10-13 DIAGNOSIS — Z12.31 VISIT FOR SCREENING MAMMOGRAM: Primary | ICD-10-CM

## 2024-10-13 DIAGNOSIS — E03.9 HYPOTHYROIDISM, UNSPECIFIED TYPE: ICD-10-CM

## 2024-10-16 NOTE — PROGRESS NOTES
Subjective   Reason for Visit: Patsy Levine is an 53 y.o. female here for her Subsequent Medicare Assessment and follow up            She declines the influenza vaccine    She is still not as active as she usually is.  She did not bike or kayak this past summer.  She feels well overall     She is seeing Dr Choe in 12/24   Her pelvic pain has improved     POTS is stable. She has had a couple episodes but they resolved quickly     She has done acupuncture, PT and massage for her back and hip pain     She is due for mammogram and scheduled for 1/25     She is seeing cardiology at [facility]  She is seeing pulmonology and Gyn at [facility]  PAP 5/18 +ASCUS, 5/19 negative, 2023- w/ Gyn  -[facility]   Mammo 1/13, 1/14, 11/21, 1/22, 2023,  and will do every other year now   -[facility]  US of breasts 11/21 normal   BD 1/13 T-2.7, 1/15 T-2.5, 2019, 8/20, 9/22 -2.8, 2024 no change,   - [facility]  Colon 11/21 normal and Q 10 with Dr Lewis    EKG  2014, 10/21, 11/21 8/22   Urine 12/18  Hep C 8/16 -  Hep A 3/19  Flu declines   TDAP 2005, 10/17/24  Typhoid 3/19  Pneumo 2010, 10/4/2022   Shingrix recommended and will check coverage   Pfizer CVD vaccine 3/22/2021 and 4/2/2021 booster 10/26/2021   Ophth [facility]. No glaucoma or MD. She is using Zylet for BELLA OU.       Patient Care Team:  Veronique Cesar MD as PCP - General  Veronique Cesar MD as PCP - [facility] Attributed Provider     Review of Systems  All systems negative except those listed in the HPI      Past Medical, Surgical, and Family History reviewed and updated in chart.  Reviewed all medications by prescribing practitioner or clinical pharmacist   (such as prescriptions, OTCs, herbal therapies and supplements) and documented in the medical record      Objective   Vitals:  Visit Vitals  BP 90/70 (BP Location: Right arm, Patient Position: Sitting)    Pulse 81   Temp 36.6 °C (97.8 °F)    Body mass index is 20.34 kg/m².     Physical Exam  Vitals reviewed.   Constitutional:       Appearance: Normal appearance. She is normal weight.   HENT:      Head: Normocephalic.      Right Ear: Tympanic membrane, ear canal and external ear normal.      Left Ear: Tympanic membrane, ear canal and external ear normal.      Nose: Nose normal.      Mouth/Throat:      Pharynx: Oropharynx is clear.   Eyes:      Conjunctiva/sclera: Conjunctivae normal.   Cardiovascular:      Rate and Rhythm: Normal rate and regular rhythm.      Pulses: Normal pulses.      Heart sounds: Normal heart sounds.   Pulmonary:      Effort: Pulmonary effort is normal.      Breath sounds: Normal breath sounds.   Abdominal:      General: Bowel sounds are normal.      Palpations: Abdomen is soft.   Musculoskeletal:         General: Normal range of motion.      Cervical back: Normal range of motion and neck supple.   Skin:     General: Skin is warm.   Neurological:      General: No focal deficit present.      Mental Status: She is alert and oriented to person, place, and time.   Psychiatric:         Mood and Affect: Mood normal.         Behavior: Behavior normal.         Thought Content: Thought content normal.         Judgment: Judgment normal.       Assessment & Plan         Subsequent Medicare Assessment and follow up completed  Labs ordered       Medicare Wellness completed  -  Discussed healthy diet and regular exercise.    -  Physical exam overall unremarkable. Immunizations reviewed and updated accordingly. Healthy lifestyle choices discussed (tobacco avoidance, appropriate alcohol use, avoidance of illicit substances).   -  Patient is wearing seatbelt.   -  Screening lab work ordered as indicated.    -  Age appropriate screening tests reviewed with patient.         --> We spent 15 minutes discussing depression screen and there is nothing found that is of concern for underling depression. The PQH form was  filled and the meds reviewed. She is seeing a psychiatrist      We spent 5 minutes discussing alcohol use and there are no concerns about overuse. The 5 min was spent in going over any issues of use of alcohol.   She might have EtOH once a month      She has grab bars in the shower.  She has not fallen recently and no risk of falls in the house   She has good lighting around the house and functioning smoke detectors.     She is  with no children  She denies previous history of tobacco use     Virtual visit with Dr Oreilly 7/3/24 for COVID positive: Resolved and no residual Sx   Rx given for Paxlovid       She has no issues with her hearing to report      Her weight/ BMI is normal range in office. Recommend she maintain  Her weight is 126 with BMI at 20.34 IO 10/2024  Her increased stressors contributed to her weight loss      SVT and is tachy from the POTs: POTS is stable. She has had a couple episodes but they resolved quickly    Continue metoprolol XL 25 mg daily   EKG 8/22 was sinus with occas PVC's and rate of 76  ECHO and Holter and told imaging improved. PVCs went from 20% to 3%    She is seeing cardiology at        I have spent 15 min face to face with this patient discussing their cardiac risk   We discussed the patients cardiovascular risk. If needed, lifestyle modifications recommended including: behavioral therapies of nutrition choices, exercise and eliminate habits that are contributing to their cardiac risk. We agreed to a plan to decrease his cardiovascular risks. Discussed ASA. Reviewed Guidelines and approved recommendations made to patient.   The patient's 10 yr CV risk was estimated at  0.5 % 10/24      Bronchiectasis:  Only affects her with humidity and exertion combined.  The inhalers give her infections so limited in treatment  [facility]      Asthma:   Controlled. No atopy - no allergy detected.  The inhalers give her infections so limited in treatment  She  is [facility]      CVID -    Continue Hyqvia 15 grams Q month at home.   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 Continue replacement immunoglobulin.      LE edema:  Worsens in the heat and has Lasix for prn use      Situational depression:   She [facility] the [facility] and helping her since the divorce in 2014   [facility] therapist put her on Wellbutrin but she could not tolerate it so stopped   She will continue following with the [facility] therapist      Saw Dr Choe in 12/1/16 for recurrent UTI :  - status post meatal dilation and cyst[facility] the [facility] done in June 2024  She is much better with taking D mannose with cranberry daily   She saw Dr Choe in 8/24      Colitis versus diverticulitis versus inflammatory bowel:   Her mother and sister have been diagnosed with diverticulitis   Colonoscopy 11/11/2021 with [facility] Dr Lewis was normal   CT abd 9/2021 was no evidence of colitis or diverticulitis, moderate stool   Recommend she begin a Probiotic daily while taking Abx   Recommend she avoid eating popcorn     Seen for possible SLE issues by Dr Martinez in 9/2013:  Nothing found and no arthralgias      Cervicalgia:    Continue Flexeril 10 mg prn   Recommend she continue PT exercises at home   Recommend she be mindful when moving and lifting.     She has done acupuncture, PT and massage for her back and hip pain      Seen in the ED on 8/8/2023 for left ankle pain  Nondisplaced left distal fibular fracture and fifth metatarsal fracture   She had left ankle pain since 8/8/2023 after falling off a rock running from bees   Xray showed fifth metatarsal fracture 8/2023  The patient received a splint and crutches.  The patient was discharged with instructions to  follow-up with Ortho.   She saw Dr LoPresti in 8/2023 and was placed in a boot   She saw Dr LoPresti again in 8/2023 and healing well       She saw Dr Ernandez on 6/10/19 for right foot pain   Xray showed mild degenerative changes of the 1st MTP with hallux valgus deformity with HVA of 23*.   Recommended custom orthotics      Alopecia:  She saw derm 10/26/2021      Bacterial vaginosis issues and Dx with Herpes 2   Estrace cream only thing can use   Continue Valtrex prn      P[facility] in 20203 [facility]     She is due for mammogram and scheduled for 1/25    [facility] in [facility] at the [facility]. She only has to do a mammogram every other year per [facility]  Breast exam normal 10/2024     BD 2024 no change at [facility]. She stopped Fosamax after 15 years.   Continue OTC Calcium 600 mg BID, OTC Vitamin D3 2000 UT daily and eat 2 servings of calcium enriched foods daily.   Discussed weight bearing exercises.   She remains busy and works out routinely     Colon 11/21 normal and Q 10 with Dr Joshua Smith:  [facility]. No glaucoma or MD. She is using Zylet for BELLA OU.       I spent 15 min with the patient discussing their wishes for end of life choices.   We discussed the need for a Living Will and that wishes should be discussed with Family. The DNR status was reviewed, and we discussed the options of this and, the DNR _CC options as well.   We also went over how important it was to have these choices written down and clear for any surviving family so that their wishes are followed   The patient and I came to to following agreement : She does not have a LW or MPOA. I provided her with the forms to complete and have notarized 10/2022.   Recommend she bring a copy of her completed forms in office to have scanned in her chart 10/2024     Hep C 8/16 -  Hep A 3/19  Flu declines   TDAP 2005, 10/17/24   Typhoid 3/19  Pneumo 2010, 10/4/2022   Shingrix recommended and will check coverage   Pfizer CVD vaccine 3/22/2021 and 4/2/2021  booster 10/26/2021       Some elements in the chart were copied from Dr. Cesar's last office visit with patient.   Notes have been updated where appropriate, and reflect my current medical decision making from today.      RTC in 1 year for CPE or sooner if needed  (CPE due 10/24)      Scribe Attestation  By signing my name below, I, Orin Timur , Scribe   attest that this documentation has been prepared under the direction and in the presence of Veronique Cesar MD.

## 2024-10-17 ENCOUNTER — APPOINTMENT (OUTPATIENT)
Dept: PRIMARY CARE | Facility: CLINIC | Age: 54
End: 2024-10-17
Payer: MEDICARE

## 2024-10-17 VITALS
SYSTOLIC BLOOD PRESSURE: 90 MMHG | HEART RATE: 81 BPM | WEIGHT: 126 LBS | TEMPERATURE: 97.8 F | OXYGEN SATURATION: 97 % | HEIGHT: 66 IN | DIASTOLIC BLOOD PRESSURE: 70 MMHG | BODY MASS INDEX: 20.25 KG/M2

## 2024-10-17 DIAGNOSIS — C92.11 LEUKEMIA, CHRONIC MYELOID, IN REMISSION (MULTI): ICD-10-CM

## 2024-10-17 DIAGNOSIS — M25.551 BILATERAL HIP PAIN: ICD-10-CM

## 2024-10-17 DIAGNOSIS — E89.40 OVARIAN FAILURE DUE TO CANCER THERAPY: ICD-10-CM

## 2024-10-17 DIAGNOSIS — M25.552 BILATERAL HIP PAIN: ICD-10-CM

## 2024-10-17 DIAGNOSIS — L20.9 ATOPIC DERMATITIS, UNSPECIFIED TYPE: ICD-10-CM

## 2024-10-17 DIAGNOSIS — Z00.00 ROUTINE GENERAL MEDICAL EXAMINATION AT HEALTH CARE FACILITY: Primary | ICD-10-CM

## 2024-10-17 DIAGNOSIS — J47.9 BRONCHIECTASIS WITHOUT COMPLICATION (MULTI): ICD-10-CM

## 2024-10-17 DIAGNOSIS — I47.10 SUPRAVENTRICULAR TACHYCARDIA (CMS-HCC): ICD-10-CM

## 2024-10-17 DIAGNOSIS — D83.9 CVID (COMMON VARIABLE IMMUNODEFICIENCY): ICD-10-CM

## 2024-10-17 DIAGNOSIS — E03.9 HYPOTHYROIDISM, UNSPECIFIED TYPE: ICD-10-CM

## 2024-10-17 DIAGNOSIS — F43.21 SITUATIONAL DEPRESSION: ICD-10-CM

## 2024-10-17 DIAGNOSIS — I49.8: ICD-10-CM

## 2024-10-17 PROBLEM — R10.32 LEFT LOWER QUADRANT PAIN: Status: RESOLVED | Noted: 2023-10-08 | Resolved: 2024-10-17

## 2024-10-17 PROBLEM — R52 PAIN, GENERALIZED: Status: RESOLVED | Noted: 2023-10-08 | Resolved: 2024-10-17

## 2024-10-17 PROBLEM — R21 RASH AND OTHER NONSPECIFIC SKIN ERUPTION: Status: RESOLVED | Noted: 2018-09-11 | Resolved: 2024-10-17

## 2024-10-17 PROBLEM — R10.2 FEMALE PELVIC PAIN: Status: RESOLVED | Noted: 2023-10-08 | Resolved: 2024-10-17

## 2024-10-17 PROCEDURE — 1036F TOBACCO NON-USER: CPT | Performed by: INTERNAL MEDICINE

## 2024-10-17 PROCEDURE — 99214 OFFICE O/P EST MOD 30 MIN: CPT | Performed by: INTERNAL MEDICINE

## 2024-10-17 PROCEDURE — G0439 PPPS, SUBSEQ VISIT: HCPCS | Performed by: INTERNAL MEDICINE

## 2024-10-17 PROCEDURE — 90715 TDAP VACCINE 7 YRS/> IM: CPT | Performed by: INTERNAL MEDICINE

## 2024-10-17 PROCEDURE — 90471 IMMUNIZATION ADMIN: CPT | Performed by: INTERNAL MEDICINE

## 2024-10-17 PROCEDURE — 3008F BODY MASS INDEX DOCD: CPT | Performed by: INTERNAL MEDICINE

## 2024-10-17 RX ORDER — METOPROLOL SUCCINATE 25 MG/1
12.5 TABLET, EXTENDED RELEASE ORAL DAILY
Qty: 45 TABLET | Refills: 3 | Status: SHIPPED | OUTPATIENT
Start: 2024-10-17 | End: 2025-10-17

## 2024-10-17 ASSESSMENT — ENCOUNTER SYMPTOMS
DEPRESSION: 0
OCCASIONAL FEELINGS OF UNSTEADINESS: 0
LOSS OF SENSATION IN FEET: 0

## 2024-10-17 ASSESSMENT — PATIENT HEALTH QUESTIONNAIRE - PHQ9
1. LITTLE INTEREST OR PLEASURE IN DOING THINGS: NOT AT ALL
2. FEELING DOWN, DEPRESSED OR HOPELESS: NOT AT ALL
SUM OF ALL RESPONSES TO PHQ9 QUESTIONS 1 AND 2: 0

## 2024-10-17 ASSESSMENT — ACTIVITIES OF DAILY LIVING (ADL)
TAKING_MEDICATION: INDEPENDENT
MANAGING_FINANCES: INDEPENDENT
DOING_HOUSEWORK: INDEPENDENT
GROCERY_SHOPPING: INDEPENDENT

## 2024-10-17 NOTE — PROGRESS NOTES
"Subjective   Reason for Visit: Patsy Levine is an 53 y.o. female here for a Medicare Wellness visit.     Past Medical, Surgical, and Family History reviewed and updated in chart.    Reviewed all medications by prescribing practitioner or clinical pharmacist (such as prescriptions, OTCs, herbal therapies and supplements) and documented in the medical record.    HPI    Patient Care Team:  Veronique Cesar MD as PCP - General  Veronique Cesar MD as PCP [place] [facility]     Review of Systems    Objective   Vitals:  BP 90/70 (BP Location: Right arm, Patient Position: Sitting)   Pulse 81   Temp 36.6 °C (97.8 °F)   Ht 1.676 m (5' 6\")   Wt 57.2 kg (126 lb)   SpO2 97%   BMI 20.34 kg/m²       Physical Exam    Assessment & Plan  Routine general medical examination at health care facility    Orders:  •  1 Year Follow Up In [facility] - PCP - Wellness Exam; Future              "

## 2024-10-17 NOTE — PATIENT INSTRUCTIONS
Recommend you bring a copy of your living will and medical power of  in office to have scanned in your chart     It was a pleasure to see you today.  I would like to remind you about importance of a healthy lifestyle in order to improve your well-being and live longer. Try to engage in physical activities for at least 150 minutes per week.  Eat about 10 servings of fruits and vegetables daily. My advice is 2 servings of fruits and 8 servings of vegetables. For vegetables choose at least half of them green and at least half of them fresh.  Please avoid sugar, salt, fried food and saturated fat.  Please follow low carbohydrate diet and daily exercise routine for at least 30 minutes. Nutritional consultation is available, please let me know if you are interested. I will be happy to discuss details with you if interested.   Have a good day and stay well.      The ability to age comfortably depends on how you invest in your body.   Include physical activity in your daily routine. Physical activity increases blood flow to your whole body, including your brain. ...  Eat a healthy diet. A heart-healthy diet may benefit your brain.   Stay mentally active. Be social.   Treat cardiovascular disease.  No smoking, excessive EtOH intake or illicit drug use.

## 2024-11-12 ENCOUNTER — LAB (OUTPATIENT)
Dept: LAB | Facility: LAB | Age: 54
End: 2024-11-12
Payer: MEDICARE

## 2024-11-12 DIAGNOSIS — R73.09 OTHER ABNORMAL GLUCOSE: ICD-10-CM

## 2024-11-12 DIAGNOSIS — R21 RASH AND OTHER NONSPECIFIC SKIN ERUPTION: ICD-10-CM

## 2024-11-12 DIAGNOSIS — C92.11 LEUKEMIA, CHRONIC MYELOID, IN REMISSION (MULTI): ICD-10-CM

## 2024-11-12 DIAGNOSIS — E03.9 HYPOTHYROIDISM, UNSPECIFIED TYPE: ICD-10-CM

## 2024-11-12 LAB
ALBUMIN SERPL BCP-MCNC: 4.1 G/DL (ref 3.4–5)
ALP SERPL-CCNC: 47 U/L (ref 33–110)
ALT SERPL W P-5'-P-CCNC: 14 U/L (ref 7–45)
ANION GAP SERPL CALC-SCNC: 12 MMOL/L (ref 10–20)
AST SERPL W P-5'-P-CCNC: 18 U/L (ref 9–39)
BILIRUB SERPL-MCNC: 0.3 MG/DL (ref 0–1.2)
BUN SERPL-MCNC: 19 MG/DL (ref 6–23)
CALCIUM SERPL-MCNC: 9.1 MG/DL (ref 8.6–10.6)
CHLORIDE SERPL-SCNC: 106 MMOL/L (ref 98–107)
CHOLEST SERPL-MCNC: 214 MG/DL (ref 0–199)
CHOLESTEROL/HDL RATIO: 3
CO2 SERPL-SCNC: 28 MMOL/L (ref 21–32)
CREAT SERPL-MCNC: 0.69 MG/DL (ref 0.5–1.05)
EGFRCR SERPLBLD CKD-EPI 2021: >90 ML/MIN/1.73M*2
ERYTHROCYTE [DISTWIDTH] IN BLOOD BY AUTOMATED COUNT: 11.9 % (ref 11.5–14.5)
EST. AVERAGE GLUCOSE BLD GHB EST-MCNC: 111 MG/DL
GLUCOSE SERPL-MCNC: 109 MG/DL (ref 74–99)
HBA1C MFR BLD: 5.5 %
HCT VFR BLD AUTO: 36.3 % (ref 36–46)
HDLC SERPL-MCNC: 72.1 MG/DL
HGB BLD-MCNC: 12 G/DL (ref 12–16)
LDH SERPL L TO P-CCNC: 125 U/L (ref 84–246)
LDLC SERPL CALC-MCNC: 123 MG/DL
MCH RBC QN AUTO: 30.5 PG (ref 26–34)
MCHC RBC AUTO-ENTMCNC: 33.1 G/DL (ref 32–36)
MCV RBC AUTO: 92 FL (ref 80–100)
NON HDL CHOLESTEROL: 142 MG/DL (ref 0–149)
NRBC BLD-RTO: 0 /100 WBCS (ref 0–0)
PLATELET # BLD AUTO: 203 X10*3/UL (ref 150–450)
POTASSIUM SERPL-SCNC: 3.9 MMOL/L (ref 3.5–5.3)
PROT SERPL-MCNC: 6.2 G/DL (ref 6.4–8.2)
RBC # BLD AUTO: 3.94 X10*6/UL (ref 4–5.2)
SODIUM SERPL-SCNC: 142 MMOL/L (ref 136–145)
T4 FREE SERPL-MCNC: 1.08 NG/DL (ref 0.78–1.48)
TRIGL SERPL-MCNC: 94 MG/DL (ref 0–149)
TSH SERPL-ACNC: 4 MIU/L (ref 0.44–3.98)
VLDL: 19 MG/DL (ref 0–40)
WBC # BLD AUTO: 5.8 X10*3/UL (ref 4.4–11.3)

## 2024-11-12 PROCEDURE — 84439 ASSAY OF FREE THYROXINE: CPT

## 2024-11-12 PROCEDURE — 84443 ASSAY THYROID STIM HORMONE: CPT

## 2024-11-12 PROCEDURE — 83615 LACTATE (LD) (LDH) ENZYME: CPT

## 2024-11-12 PROCEDURE — 85027 COMPLETE CBC AUTOMATED: CPT

## 2024-11-12 PROCEDURE — 80053 COMPREHEN METABOLIC PANEL: CPT

## 2024-11-12 PROCEDURE — 36415 COLL VENOUS BLD VENIPUNCTURE: CPT

## 2024-11-12 PROCEDURE — 83036 HEMOGLOBIN GLYCOSYLATED A1C: CPT

## 2024-11-12 PROCEDURE — 80061 LIPID PANEL: CPT

## 2024-11-13 NOTE — RESULT ENCOUNTER NOTE
Kel Garner-  The glucose is a little elevated but A1c is good range still   Cholesterol is adequate   The thyroid is a little on the low function borderline and would not treat for now but we need to monitor the levels   Rest of the labs are good range

## 2024-11-18 ENCOUNTER — OFFICE VISIT (OUTPATIENT)
Dept: URGENT CARE | Age: 54
End: 2024-11-18
Payer: MEDICARE

## 2024-11-18 VITALS
DIASTOLIC BLOOD PRESSURE: 52 MMHG | WEIGHT: 125 LBS | BODY MASS INDEX: 20.09 KG/M2 | HEIGHT: 66 IN | TEMPERATURE: 98 F | SYSTOLIC BLOOD PRESSURE: 85 MMHG | HEART RATE: 88 BPM | OXYGEN SATURATION: 97 % | RESPIRATION RATE: 16 BRPM

## 2024-11-18 DIAGNOSIS — J01.00 ACUTE MAXILLARY SINUSITIS, RECURRENCE NOT SPECIFIED: Primary | ICD-10-CM

## 2024-11-18 PROCEDURE — 99203 OFFICE O/P NEW LOW 30 MIN: CPT | Performed by: PHYSICIAN ASSISTANT

## 2024-11-18 PROCEDURE — 3008F BODY MASS INDEX DOCD: CPT | Performed by: PHYSICIAN ASSISTANT

## 2024-11-18 RX ORDER — FLUCONAZOLE 150 MG/1
150 TABLET ORAL ONCE
Qty: 1 TABLET | Refills: 0 | Status: SHIPPED | OUTPATIENT
Start: 2024-11-18 | End: 2024-11-18

## 2024-11-18 RX ORDER — DOXYCYCLINE HYCLATE 100 MG
100 TABLET ORAL 2 TIMES DAILY
Qty: 20 TABLET | Refills: 0 | Status: SHIPPED | OUTPATIENT
Start: 2024-11-18 | End: 2024-11-28

## 2024-11-18 ASSESSMENT — ENCOUNTER SYMPTOMS: SINUS COMPLAINT: 1

## 2024-11-18 NOTE — PROGRESS NOTES
Subjective   Patient ID: Patsy Levine is a 53 y.o. female. They present today with a chief complaint of Sinus Problem (Cold , thrush / dry mouth / x1 wks or so ).    History of Present Illness  This is a 53-year-old female who presents to the urgent care with complaints of nasal congestion, sinus pain.  Patient been having symptoms for the past week.  No improvement with over-the-counter medication.  Patient denies any fevers or chills.  Patient states she did take COVID test and they were negative.  Patient states she does get a history of sinus infections.  Patient states her mouth is also been more dry than normal.  Pt denies any other systemic complaints at this time.       Sinus Problem      Past Medical History  Allergies as of 11/18/2024 - Reviewed 11/18/2024   Allergen Reaction Noted    Alendronate Unknown 10/08/2023    Bupropion hcl Unknown 10/08/2023    Nuevo Unknown 10/08/2023    Metaxalone Unknown 10/08/2023    Metronidazole Hives 10/08/2023    Other Unknown 10/08/2023    Sulfamethoxazole-trimethoprim Hives 10/29/2014    Trimethoprim Unknown 06/11/2010    Fexofenadine Rash 10/29/2014    Loratadine Rash 10/29/2014    Methylphenidate Hives and Rash 10/29/2014       (Not in a hospital admission)       Past Medical History:   Diagnosis Date    Abnormal immunological findings in specimens from other organs, systems and tissues 09/23/2013    Nonspecific immunological findings    Acute atopic conjunctivitis, bilateral 09/17/2019    Allergic conjunctivitis of both eyes    Acute atopic conjunctivitis, unspecified eye 08/27/2015    Allergic conjunctivitis    Acute atopic conjunctivitis, unspecified eye 05/19/2015    Allergic conjunctivitis    Acute atopic conjunctivitis, unspecified eye 05/19/2015    Allergic conjunctivitis    Acute atopic conjunctivitis, unspecified eye 05/19/2015    Allergic conjunctivitis    Acute atopic conjunctivitis, unspecified eye 05/19/2015    Allergic conjunctivitis    Acute  pharyngitis, unspecified 07/12/2016    Sore throat    Acute recurrent maxillary sinusitis 02/18/2020    Acute recurrent maxillary sinusitis    Acute upper respiratory infection, unspecified 11/27/2020    Viral upper respiratory tract infection    Asymptomatic premature menopause 06/15/2016    Premature menopause    Contact with and (suspected) exposure to infections with a predominantly sexual mode of transmission 01/06/2017    STD exposure    Dry eye syndrome of bilateral lacrimal glands 09/17/2019    Bilateral dry eyes    Dry eye syndrome of unspecified lacrimal gland 05/30/2014    Dry eye    Dry eye syndrome of unspecified lacrimal gland 05/19/2015    Dry eye    Dry eye syndrome of unspecified lacrimal gland 05/19/2015    Dry eye    Dry eye syndrome of unspecified lacrimal gland 05/19/2015    Dry eye    Dry eye syndrome of unspecified lacrimal gland 05/19/2015    Dry eye    Encounter for other specified prophylactic measures 08/20/2018    Altitude sickness prophylaxis    Encounter for screening for malignant neoplasm of cervix 06/15/2016    Cervical cancer screening    [id number] disease, unspecified (Multi)     Graft vs host disease    Hypermetropia, bilateral 09/17/2019    Hyperopia of both eyes with astigmatism and presbyopia    Lower abdominal pain, unspecified 10/27/2021    Lower abdominal pain of unknown etiology    Other conditions influencing health status     Chronic Myelogenous Leukemia In Remission    Other conditions influencing health status     Pulmonary Embolism    Other specified noninflammatory disorders of vagina 11/17/2016    Vaginal irritation    Other specified noninflammatory disorders of vulva and perineum 04/23/2018    Vulvar lesion    Partial intestinal obstruction, unspecified as to cause (Multi) 06/22/2017    Partial small bowel obstruction    Personal history of diseases of the blood and blood-forming organs and certain disorders involving the immune mechanism 06/15/2020     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    Personal history of diseases of the blood and blood-forming organs and certain disorders involving the immune mechanism     History of immune disorder    Personal history of other diseases of the digestive system 06/22/2017    History of irritable bowel syndrome    Personal history of other diseases of the digestive system 06/22/2017    History of bloody stools    Personal history of other diseases of the female genital tract 09/30/2016    History of vaginal discharge    Personal history of other diseases of the female genital tract 04/23/2018    History of vaginal pruritus    Personal history of other diseases of the female genital tract 01/06/2017    History of vaginitis    Personal history of other diseases of the respiratory system 06/27/2014    Personal history of sinusitis    Personal history of other diseases of the respiratory system 02/25/2016    History of sinusitis    Personal history of other diseases of urinary system 03/20/2017    History of hematuria    Personal history of other infectious and parasitic diseases 04/23/2018    History of herpes genitalis    Personal history of other infectious and parasitic diseases 04/01/2019    History of traveler's diarrhea    Personal history of other specified conditions 07/12/2016    History of postnasal drip    Personal history of other specified conditions 07/18/2018    History of persistent cough    Personal history of other specified conditions 05/18/2015    History of edema    Postural orthostatic tachycardia syndrome (POTS)     POTS (postural orthostatic tachycardia syndrome)    Presbyopia 05/15/2018    Presbyopia    Unspecified acute conjunctivitis, bilateral 11/30/2016    Acute conjunctivitis of both eyes    Unspecified injury of left wrist, hand and finger(s), initial encounter 10/20/2020    Left wrist injury, initial encounter    Unspecified sprain of left wrist, initial encounter 10/20/2020    Left wrist sprain,  "initial encounter    Urinary tract infection, site not specified 02/18/2020    Acute UTI    Urinary tract infection, site not specified 03/08/2016    Acute UTI    Urinary tract infection, site not specified 11/17/2016    Acute urinary tract infection    Urinary tract infection, site not specified 11/30/2016    Recurrent UTI       Past Surgical History:   Procedure Laterality Date    OTHER SURGICAL HISTORY  11/06/2012    Intranasal Reconstruction    OTHER SURGICAL HISTORY  04/12/2013    Reported Hx Of Hip Replacement - Right Side    OTHER SURGICAL HISTORY  08/06/2013    Bone Marrow Transplant        reports that she has never smoked. She has never been exposed to tobacco smoke. She has never used smokeless tobacco. She reports current alcohol use. She reports that she does not use drugs.    Review of Systems  Review of Systems   All other systems reviewed and are negative.                                 Objective    Vitals:    11/18/24 1712   BP: 85/52   Pulse: 88   Resp: 16   Temp: 36.7 °C (98 °F)   SpO2: 97%   Weight: 56.7 kg (125 lb)   Height: 1.676 m (5' 6\")     No LMP recorded (lmp unknown). (Menstrual status: Menopausal).    Physical Exam  Vitals and nursing note reviewed.   Constitutional:       Appearance: Normal appearance.   HENT:      Head: Normocephalic and atraumatic.      Comments: Sinus pressure     Right Ear: Ear canal and external ear normal.      Left Ear: Ear canal and external ear normal.      Ears:      Comments: Middle ear fluid bilaterally     Nose: Nose normal.      Mouth/Throat:      Mouth: Mucous membranes are moist.      Pharynx: Oropharynx is clear.   Eyes:      Extraocular Movements: Extraocular movements intact.      Conjunctiva/sclera: Conjunctivae normal.   Cardiovascular:      Rate and Rhythm: Normal rate and regular rhythm.   Pulmonary:      Effort: Pulmonary effort is normal.      Breath sounds: Normal breath sounds.   Musculoskeletal:      Cervical back: Neck supple.   Skin:     " General: Skin is warm and dry.   Neurological:      General: No focal deficit present.      Mental Status: She is alert and oriented to person, place, and time.   Psychiatric:         Mood and Affect: Mood normal.         Behavior: Behavior normal.         Procedures    Point of Care Test & Imaging Results from this visit  No results found for this visit on 11/18/24.   No results found.    Diagnostic study results (if any) were reviewed by Carolin Corey PA-C.    Assessment/Plan   Allergies, medications, history, and pertinent labs/EKGs/Imaging reviewed by Carolin Corey PA-C.     Medical Decision Making  Sinusitis->greater than 1 week of symptoms of nasal congestion, facial pain.  Lungs clear to auscultation bilaterally.  Patient discussed concern for yeast and Diflucan pill was sent.  Afebrile, nontoxic-appearing no acute distress.    Orders and Diagnoses  Diagnoses and all orders for this visit:  Acute maxillary sinusitis, recurrence not specified  -     doxycycline (Vibra-Tabs) 100 mg tablet; Take 1 tablet (100 mg) by mouth 2 times a day for 10 days. Take with a full glass of water and do not lie down for at least 30 minutes after.  -     fluconazole (Diflucan) 150 mg tablet; Take 1 tablet (150 mg) by mouth 1 time for 1 dose.      Medical Admin Record      Patient disposition: Home    Electronically signed by Carolin Corey PA-C  5:35 PM

## 2024-12-04 ENCOUNTER — TELEPHONE (OUTPATIENT)
Dept: PRIMARY CARE | Facility: CLINIC | Age: 54
End: 2024-12-04
Payer: MEDICARE

## 2024-12-04 DIAGNOSIS — J01.00 ACUTE MAXILLARY SINUSITIS, RECURRENCE NOT SPECIFIED: ICD-10-CM

## 2024-12-04 RX ORDER — DOXYCYCLINE HYCLATE 100 MG
100 TABLET ORAL 2 TIMES DAILY
Qty: 14 TABLET | Refills: 0 | Status: SHIPPED | OUTPATIENT
Start: 2024-12-04 | End: 2024-12-11

## 2024-12-04 NOTE — TELEPHONE ENCOUNTER
Pt went to urgent care at [facility] for sinus issues and she has finished the medication however is still having issues with her sinuses. Pt wanted to know if she can get more antibiotic. Pt is coming in next Wed. To follow up from urgent care and discuss getting a letter for a trip she was not able to take due to her illness. Pt already had an appt on Thursday and was unable to come in at 4:30 that day. Call and advise

## 2024-12-05 ENCOUNTER — APPOINTMENT (OUTPATIENT)
Dept: UROLOGY | Facility: CLINIC | Age: 54
End: 2024-12-05
Payer: MEDICARE

## 2024-12-05 VITALS — DIASTOLIC BLOOD PRESSURE: 72 MMHG | HEART RATE: 77 BPM | TEMPERATURE: 98 F | SYSTOLIC BLOOD PRESSURE: 111 MMHG

## 2024-12-05 DIAGNOSIS — N39.498: ICD-10-CM

## 2024-12-05 DIAGNOSIS — R33.9 URINARY RETENTION: ICD-10-CM

## 2024-12-05 PROCEDURE — 51798 US URINE CAPACITY MEASURE: CPT | Performed by: UROLOGY

## 2024-12-05 PROCEDURE — 51741 ELECTRO-UROFLOWMETRY FIRST: CPT | Performed by: UROLOGY

## 2024-12-05 PROCEDURE — 99213 OFFICE O/P EST LOW 20 MIN: CPT | Performed by: UROLOGY

## 2024-12-05 NOTE — PROGRESS NOTES
Patient ID: Patsy Levine is a 54 y.o. female.    HISTORY OF PRESENT ILLNESS:  This is a 53 y.o. female who presents for follow-up for urinary retention. She was last seen by our office on [8/29/24].  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  She was dilated at the [facility].  Her voiding pattern has improved since that.  She has been compliant with estrogen cream.  She is presented today for a uroflow examination.    Patient underwent uroflow testing. Below is a transcription of its findings:    Review of urodynamic testing revealed on uroflow the patient voided 309 cc with a peak flow of 27 cc/sec and an average flow of 13.3 cc/sec in an continuous bell-shaped curve stream. Patient left 0 cc residual.           PHYSICAL EXAMINATION:  No LMP recorded (lmp unknown). (Menstrual status: Menopausal).  There is no height or weight on file to calculate BMI.  Visit Vitals  /72   Pulse 77   Temp 36.7 °C (98 °F)   LMP  (LMP Unknown)   OB Status Menopausal   Smoking Status Never     General Appearance: well appearing  Neuro: Alert and oriented     IMPRESSION AND PLAN:  Patsy Levine is a 53 y.o. who presents with urinary retention secondary to meatal stenosis.  Patient status post urethral dilation.  Her uroflow shows a bell-shaped curve today with a peak flow of 27 cc/s and 0 residual.  Patient was reassured about the pattern of the void.  She was advised to continue estrogen cream and follow-up with us as needed.      Follow up as needed.       Scribe Attestation  By signing my name below, Rosa BHATTI Scribe   attest that this documentation has been prepared under the direction and in the presence of Kevin Choe MD.

## 2024-12-10 NOTE — PROGRESS NOTES
Subjective   Patient ID: Patsy Levine is a 54 y.o. female who presents for follow up from , Seen in the UC on 11/18/24 and Dx with acute maxillary sinusitis       Seen in the UC on 11/18/24 and Dx with acute maxillary sinusitis  Rx given for doxycycline and Diflucan    12/4/24 another course of doxycycline was called in due to patient still feeling poorly  She states she feels better but still has a lot of PND and a productive cough in the morning  The mucous is thick. She is using Flonase nasal spray BID.   [facility]    She may change immunotherapy in 2025     She is [facility]    She is due to mammogram in 1/25    HEALTH  PAP 5/18 +ASCUS, 5/19, 2023- w/ Gyn  -[facility] at [facility]   Mammo 11/21, 1/22, 2023,   will do every other year now   -She does this at [facility]  US of breasts 11/21 normal   BD 1/13 T-2.7, 1/15 T-2.5, 9/22 -2.8, 2024 no change,   - She does this at [facility]  Colon 11/21 normal and Q 10 with Dr Joshua FERMIN  2014, 10/21, 11/21, 8/22   Urine 12/18  Hep C 8/16 -  Hep A 3/19  Flu declines   TDAP 2005, 10/17/24  Pneumo 2010, 10/4/2022   Prevnar never  Shingrix recommended and will check coverage   Pfizer CVD 3/22/2021 and 4/2/2021 booster 10/26/2021   Ophth [facility]. No glaucoma or MD. She has BELLA both eyes and using lubricating drops.         Review of Systems  All systems negative except those listed in the HPI      Objective   Visit Vitals  BP 99/68   Pulse 84   Temp 35.8 °C (96.5 °F)    Body mass index is 20.69 kg/m².      Physical Exam  Vitals reviewed.   Constitutional:       Appearance: Normal appearance. She is normal weight.   HENT:      Head: Normocephalic.      Right Ear: Tympanic membrane, ear canal and external ear normal.      Left Ear: Tympanic membrane, ear canal and external ear normal.      Nose: Nose normal.      Comments: Septum intact, no heme,  nares dry bilaterally     Mouth/Throat:      Pharynx: Oropharynx is clear.   Eyes:      Conjunctiva/sclera: Conjunctivae normal.   Cardiovascular:      Rate and Rhythm: Normal rate and regular rhythm.      Pulses: Normal pulses.      Heart sounds: Normal heart sounds.   Pulmonary:      Effort: Pulmonary effort is normal.      Breath sounds: Normal breath sounds.   Abdominal:      General: Bowel sounds are normal.      Palpations: Abdomen is soft.   Musculoskeletal:         General: Normal range of motion.      Cervical back: Normal range of motion and neck supple.   Skin:     General: Skin is warm.   Neurological:      General: No focal deficit present.      Mental Status: She is alert and oriented to person, place, and time.   Psychiatric:         Mood and Affect: Mood normal.         Behavior: Behavior normal.         Thought Content: Thought content normal.         Judgment: Judgment normal.       Assessment/Plan   Problem List Items Addressed This Visit    None  Visit Diagnoses       Respiratory tract infection due to COVID-19 virus    -  Primary    Relevant Medications    azelastine (Astelin) 137 mcg (0.1 %) nasal spray    Dry eye        Relevant Medications    lubricating eye drops (Lubricating Plus) ophthalmic solution    Nasal congestion        Relevant Medications    azelastine (Astelin) 137 mcg (0.1 %) nasal spray           Follow up completed  Reviewed her labs from 11/24   TSH 4.00- we will continue to monitor. She can add iodine through diet, seaweed, kelp etc     She is  with no children  She denies previous history of tobacco use     Seen in the UC on 11/18/24 and Dx with acute maxillary sinusitis: On exam:; nothing infectious noted, septum intact, no heme, nares dry bilaterally 12/24.   Rx given for doxycycline and Diflucan    12/4/24 another course of doxycycline was called in due to patient still feeling poorly  She feels better but still has a lot of PND and a productive cough in the  morning  The mucous is thick. She is using Flonase nasal spray BID.   She has dust covers on her bed.   Recommend OTC Astelin NS BID   Continue Flonase NS daily until Sx resolve and stop   Recommend humidifier use at night during winter months (Oct- Mar)   Recommend trying OTC [place] radha to apply to the nares at night   Encouraged rest and increased hydration with warm/tepid fluids   Recommend she discuss her sinus issues with immunology - do we need to treat more aggressively or just wait to see if this resolves. [facility]   She has [facility] 12/24- recommend she see if [facility] wants [facility] of [facility]uses.   She will call if Sx persist or worsen      She has no issues with her hearing to report      Her weight/ BMI is normal range in office. Recommend she maintain  Her weight is 128 with BMI at 20.69 IO 12/2024  Her increased stressors contributed to her weight loss      SVT and is tachy from the POTs: POTS is stable.    Continue metoprolol XL 25 mg daily   EKG 8/22 was sinus with occas PVC's and rate of 76  ECHO and Holter and told imaging improved. PVCs went from 20% to 3%    She is seeing cardiology at        I have spent 15 min face to face with this patient discussing their cardiac risk   We discussed the patients cardiovascular risk. If needed, lifestyle modifications recommended including: behavioral therapies of nutrition choices, exercise and eliminate habits that are contributing to their cardiac risk. We agreed to a plan to decrease his cardiovascular risks. Discussed ASA. Reviewed Guidelines and approved recommendations made to patient.   The patient's 10 yr CV risk was estimated at  0.9 % 12/24     HLD:  and HDL 72 on labs in 11/24  Explained goal for LDL to be less than 100 and ideally less than 70      Bronchiectasis:  Only affects her with humidity and exertion combined.  The inhalers give her infections so limited in treatment  She is  [facility]      Asthma:   Controlled. No atopy - no allergy detected.  The inhalers give her infections so limited in treatment  She is following [facility]ology at [facility]      CVID - she may change immunotherapy in 2025    Continue Hyqvia 15 grams Q month at home.   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 Continue replacement immunoglobulin.   She saw Heme onc in 2/2022     Seen for possible SLE issues by Dr Martinez in 9/2013:  Nothing found and no arthralgias       LE edema:  Worsens in the heat and has Lasix for prn use      Situational depression:   [facility] and helping her since the divorce in 2014   [facility] put her on Wellbutrin but she could not tolerate it so stopped   She will continue [facility]      Recurrent UTI :  - status post meatal dilation and [facility] done in June 2024  She is much better with taking D mannose with cranberry daily   She saw Dr Choe in 12/24 continue estrogen cream and follow again prn     Colitis versus diverticulitis versus inflammatory bowel:   Her mother and sister have been diagnosed with diverticulitis   Colonoscopy 11/11/2021 with [facility] Dr Lewis was normal   CT abd 9/2021 was no evidence of colitis or diverticulitis, moderate stool   Recommend she begin a Probiotic daily while taking Abx   Recommend she avoid eating popcorn     Cervicalgia:    Continue Flexeril 10 mg prn   Recommend she continue PT exercises at home   Recommend she be mindful when moving and lifting.      She has done acupuncture, PT and massage for her back and hip pain       Seen in the ED on 8/8/2023 for left ankle pain  Nondisplaced left distal fibular fracture and fifth metatarsal fracture   She had left ankle  pain since 8/8/2023 after falling off a rock running from bees   Xray showed fifth metatarsal fracture 8/2023  The patient received a splint and crutches.  The patient was discharged with instructions to follow-up with Ortho.   She saw Dr LoPresti in 8/2023 and was placed in a boot   She saw Dr LoPresti again in 8/2023 and healing well       She saw Dr Ernandez on 6/10/19 for right foot pain   Xray showed mild degenerative changes of the 1st MTP with hallux valgus deformity with HVA of 23*.   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 She only has to do a mammogram every other year per [facility]  Breast exam normal 10/2024     BD 2024 [facility] change at [facility]. She stopped Fosamax after 15 years.   Continue OTC Calcium 600 mg BID, OTC Vitamin D3 2000 UT daily and eat 2 servings of calcium enriched foods daily.   Discussed weight bearing exercises.   She remains busy and works out routinely      Colon 11/21 normal and Q 10 with Dr Joshua Smith:  [facility]. No glaucoma or MD.   She has BELLA both eyes and using lubricating drops.     Recommend humidifier use at night during winter months (Oct- Mar)       She does not have a LW or MPOA.  I provided her with the forms to complete and have notarized 10/2022.   Recommend she bring a copy of her completed forms in office to have scanned in her chart 10/2024     Hep C 8/16 -  Hep A 3/19  Flu declines   TDAP 2005, 10/17/24  Pneumo 2010, 10/4/2022   Prevnar never  Shingrix recommended and will check coverage   Pfizer CVD 3/22/2021 and 4/2/2021 booster 10/26/2021      Some elements in the chart were copied from Dr. Cesar's last office visit with patient.   Notes have been updated where appropriate, and  reflect my current medical decision making from today.      RTC in 10/25 for CPE or sooner if needed  (CPE due 10/25)      Scribe Attestation  By signing my name below, I, Orin Ding , Scribe   attest that this documentation has been prepared under the direction and in the presence of Veronique Cesar MD.

## 2024-12-11 ENCOUNTER — APPOINTMENT (OUTPATIENT)
Dept: PRIMARY CARE | Facility: CLINIC | Age: 54
End: 2024-12-11
Payer: MEDICARE

## 2024-12-11 VITALS
OXYGEN SATURATION: 94 % | DIASTOLIC BLOOD PRESSURE: 68 MMHG | HEART RATE: 84 BPM | BODY MASS INDEX: 20.6 KG/M2 | TEMPERATURE: 96.5 F | SYSTOLIC BLOOD PRESSURE: 99 MMHG | HEIGHT: 66 IN | WEIGHT: 128.2 LBS

## 2024-12-11 DIAGNOSIS — M81.0 AGE-RELATED OSTEOPOROSIS WITHOUT CURRENT PATHOLOGICAL FRACTURE: ICD-10-CM

## 2024-12-11 DIAGNOSIS — N95.2 VAGINAL ATROPHY: ICD-10-CM

## 2024-12-11 DIAGNOSIS — M54.12 CERVICAL RADICULOPATHY: ICD-10-CM

## 2024-12-11 DIAGNOSIS — H04.129 DRY EYE: ICD-10-CM

## 2024-12-11 DIAGNOSIS — M25.552 BILATERAL HIP PAIN: ICD-10-CM

## 2024-12-11 DIAGNOSIS — I47.10 SUPRAVENTRICULAR TACHYCARDIA (CMS-HCC): ICD-10-CM

## 2024-12-11 DIAGNOSIS — J47.9 BRONCHIECTASIS WITHOUT COMPLICATION (MULTI): ICD-10-CM

## 2024-12-11 DIAGNOSIS — E03.9 HYPOTHYROIDISM, UNSPECIFIED TYPE: ICD-10-CM

## 2024-12-11 DIAGNOSIS — M25.551 BILATERAL HIP PAIN: ICD-10-CM

## 2024-12-11 DIAGNOSIS — C92.11 LEUKEMIA, CHRONIC MYELOID, IN REMISSION (MULTI): ICD-10-CM

## 2024-12-11 DIAGNOSIS — J98.8 RESPIRATORY TRACT INFECTION DUE TO COVID-19 VIRUS: Primary | ICD-10-CM

## 2024-12-11 DIAGNOSIS — R09.81 NASAL CONGESTION: ICD-10-CM

## 2024-12-11 DIAGNOSIS — H04.123 DRY EYE SYNDROME OF BOTH EYES: ICD-10-CM

## 2024-12-11 DIAGNOSIS — L20.9 ATOPIC DERMATITIS, UNSPECIFIED TYPE: ICD-10-CM

## 2024-12-11 DIAGNOSIS — D83.9 CVID (COMMON VARIABLE IMMUNODEFICIENCY): ICD-10-CM

## 2024-12-11 DIAGNOSIS — U07.1 RESPIRATORY TRACT INFECTION DUE TO COVID-19 VIRUS: Primary | ICD-10-CM

## 2024-12-11 PROBLEM — S92.353A CLOSED FRACTURE OF FIFTH METATARSAL BONE: Status: RESOLVED | Noted: 2023-10-08 | Resolved: 2024-12-11

## 2024-12-11 PROBLEM — M20.11 HALLUX VALGUS, RIGHT: Status: RESOLVED | Noted: 2023-10-08 | Resolved: 2024-12-11

## 2024-12-11 PROBLEM — G89.29 CHRONIC PAIN OF LEFT KNEE: Status: RESOLVED | Noted: 2023-10-08 | Resolved: 2024-12-11

## 2024-12-11 PROBLEM — M70.62 TROCHANTERIC BURSITIS OF LEFT HIP: Status: RESOLVED | Noted: 2023-10-08 | Resolved: 2024-12-11

## 2024-12-11 PROBLEM — T14.8XXA HEMATOMA: Status: RESOLVED | Noted: 2023-10-08 | Resolved: 2024-12-11

## 2024-12-11 PROBLEM — S92.302A FRACTURE OF METATARSAL BONE OF LEFT FOOT: Status: RESOLVED | Noted: 2023-10-08 | Resolved: 2024-12-11

## 2024-12-11 PROBLEM — M25.562 CHRONIC PAIN OF LEFT KNEE: Status: RESOLVED | Noted: 2023-10-08 | Resolved: 2024-12-11

## 2024-12-11 PROCEDURE — 3008F BODY MASS INDEX DOCD: CPT | Performed by: INTERNAL MEDICINE

## 2024-12-11 PROCEDURE — 1036F TOBACCO NON-USER: CPT | Performed by: INTERNAL MEDICINE

## 2024-12-11 PROCEDURE — 99214 OFFICE O/P EST MOD 30 MIN: CPT | Performed by: INTERNAL MEDICINE

## 2024-12-11 PROCEDURE — G2211 COMPLEX E/M VISIT ADD ON: HCPCS | Performed by: INTERNAL MEDICINE

## 2024-12-11 RX ORDER — CARBOXYMETHYLCELLULOSE SODIUM 0.5 %
1 DROPPERETTE, SINGLE-USE DROP DISPENSER OPHTHALMIC (EYE) 2 TIMES DAILY PRN
Qty: 100 EACH | Refills: 3 | Status: SHIPPED | OUTPATIENT
Start: 2024-12-11 | End: 2025-12-11

## 2024-12-11 RX ORDER — AZELASTINE 1 MG/ML
1 SPRAY, METERED NASAL 2 TIMES DAILY
Qty: 30 ML | Refills: 12 | Status: SHIPPED | OUTPATIENT
Start: 2024-12-11 | End: 2025-12-11

## 2024-12-11 RX ORDER — FLUCONAZOLE 150 MG/1
150 TABLET ORAL ONCE
COMMUNITY
Start: 2024-11-18

## 2024-12-11 ASSESSMENT — PATIENT HEALTH QUESTIONNAIRE - PHQ9
1. LITTLE INTEREST OR PLEASURE IN DOING THINGS: NOT AT ALL
SUM OF ALL RESPONSES TO PHQ9 QUESTIONS 1 AND 2: 0
2. FEELING DOWN, DEPRESSED OR HOPELESS: NOT AT ALL

## 2024-12-16 DIAGNOSIS — H04.123 DRY EYE SYNDROME OF BOTH EYES: ICD-10-CM

## 2024-12-16 RX ORDER — LOTEPREDNOL ETABONATE AND TOBRAMYCIN 5; 3 MG/ML; MG/ML
SUSPENSION/ DROPS OPHTHALMIC
Qty: 5 ML | Refills: 1 | Status: SHIPPED | OUTPATIENT
Start: 2024-12-16

## 2024-12-16 NOTE — TELEPHONE ENCOUNTER
She was in to see you 12/11/24, she needed zylet to be called in to the local pharmacy not refresh eye drops, I am pending the zylet to you

## 2025-08-27 ENCOUNTER — OFFICE VISIT (OUTPATIENT)
Dept: PRIMARY CARE | Facility: CLINIC | Age: 55
End: 2025-08-27
Payer: MEDICARE

## 2025-08-27 VITALS
DIASTOLIC BLOOD PRESSURE: 80 MMHG | SYSTOLIC BLOOD PRESSURE: 120 MMHG | TEMPERATURE: 98 F | OXYGEN SATURATION: 98 % | HEIGHT: 66 IN | BODY MASS INDEX: 20.34 KG/M2 | WEIGHT: 126.6 LBS | RESPIRATION RATE: 16 BRPM | HEART RATE: 85 BPM

## 2025-08-27 DIAGNOSIS — R19.7 DIARRHEA, UNSPECIFIED TYPE: Primary | ICD-10-CM

## 2025-08-27 DIAGNOSIS — D84.89 OTHER IMMUNODEFICIENCIES: ICD-10-CM

## 2025-08-27 PROCEDURE — 99214 OFFICE O/P EST MOD 30 MIN: CPT | Performed by: NURSE PRACTITIONER

## 2025-08-27 PROCEDURE — 3008F BODY MASS INDEX DOCD: CPT | Performed by: NURSE PRACTITIONER

## 2025-08-27 PROCEDURE — 1036F TOBACCO NON-USER: CPT | Performed by: NURSE PRACTITIONER

## 2025-08-27 ASSESSMENT — PATIENT HEALTH QUESTIONNAIRE - PHQ9
SUM OF ALL RESPONSES TO PHQ9 QUESTIONS 1 AND 2: 0
2. FEELING DOWN, DEPRESSED OR HOPELESS: NOT AT ALL
1. LITTLE INTEREST OR PLEASURE IN DOING THINGS: NOT AT ALL

## 2025-08-28 LAB
ANION GAP SERPL CALCULATED.4IONS-SCNC: 9 MMOL/L (CALC) (ref 7–17)
BUN SERPL-MCNC: 19 MG/DL (ref 7–25)
BUN/CREAT SERPL: NORMAL (CALC) (ref 6–22)
CALCIUM SERPL-MCNC: 9.6 MG/DL (ref 8.6–10.4)
CHLORIDE SERPL-SCNC: 105 MMOL/L (ref 98–110)
CO2 SERPL-SCNC: 27 MMOL/L (ref 20–32)
CREAT SERPL-MCNC: 0.74 MG/DL (ref 0.5–1.03)
EGFRCR SERPLBLD CKD-EPI 2021: 96 ML/MIN/1.73M2
ERYTHROCYTE [DISTWIDTH] IN BLOOD BY AUTOMATED COUNT: 12.4 % (ref 11–15)
GLUCOSE SERPL-MCNC: 91 MG/DL (ref 65–99)
HCT VFR BLD AUTO: 38.7 % (ref 35–45)
HGB BLD-MCNC: 12.8 G/DL (ref 11.7–15.5)
MCH RBC QN AUTO: 31.3 PG (ref 27–33)
MCHC RBC AUTO-ENTMCNC: 33.1 G/DL (ref 32–36)
MCV RBC AUTO: 94.6 FL (ref 80–100)
PLATELET # BLD AUTO: 209 THOUSAND/UL (ref 140–400)
PMV BLD REES-ECKER: 9.5 FL (ref 7.5–12.5)
POTASSIUM SERPL-SCNC: 4.3 MMOL/L (ref 3.5–5.3)
RBC # BLD AUTO: 4.09 MILLION/UL (ref 3.8–5.1)
SODIUM SERPL-SCNC: 141 MMOL/L (ref 135–146)
WBC # BLD AUTO: 6.2 THOUSAND/UL (ref 3.8–10.8)

## 2025-09-01 LAB
C COLI+JEJUNI+LARI FUSA STL QL NAA+PROBE: NOT DETECTED
EC STX1 GENE STL QL NAA+PROBE: NOT DETECTED
EC STX2 GENE STL QL NAA+PROBE: NOT DETECTED
NOROV GI+II ORF1-ORF2 JNC STL QL NAA+PR: NOT DETECTED
RVA NSP5 STL QL NAA+PROBE: NOT DETECTED
SALMONELLA SP RPOD STL QL NAA+PROBE: NOT DETECTED
SHIGELLA DNA SPEC QL NAA+PROBE: NOT DETECTED
V CHOL+PARA RFBL+TRKH+TNAA STL QL NAA+PR: NOT DETECTED
Y ENTERO RECN STL QL NAA+PROBE: NOT DETECTED

## 2025-09-05 LAB — CALPROTECTIN STL-MCNT: 12 MCG/G

## 2025-10-23 ENCOUNTER — APPOINTMENT (OUTPATIENT)
Dept: PRIMARY CARE | Facility: CLINIC | Age: 55
End: 2025-10-23
Payer: MEDICARE